# Patient Record
Sex: MALE | Race: WHITE | HISPANIC OR LATINO | Employment: FULL TIME | URBAN - METROPOLITAN AREA
[De-identification: names, ages, dates, MRNs, and addresses within clinical notes are randomized per-mention and may not be internally consistent; named-entity substitution may affect disease eponyms.]

---

## 2017-12-15 ENCOUNTER — HOSPITAL ENCOUNTER (EMERGENCY)
Facility: MEDICAL CENTER | Age: 47
End: 2017-12-16
Attending: EMERGENCY MEDICINE

## 2017-12-15 DIAGNOSIS — S01.81XA FACIAL LACERATION, INITIAL ENCOUNTER: ICD-10-CM

## 2017-12-15 PROCEDURE — 303747 HCHG EXTRA SUTURE

## 2017-12-15 PROCEDURE — 304992 HCHG REPAIR-COMPLEX-LVL 1,1.1-7.5CM

## 2017-12-15 PROCEDURE — 99284 EMERGENCY DEPT VISIT MOD MDM: CPT

## 2017-12-15 PROCEDURE — 304217 HCHG IRRIGATION SYSTEM

## 2017-12-15 PROCEDURE — 13132 CMPLX RPR F/C/C/M/N/AX/G/H/F: CPT

## 2017-12-15 PROCEDURE — 700101 HCHG RX REV CODE 250

## 2017-12-15 RX ORDER — LIDOCAINE HYDROCHLORIDE AND EPINEPHRINE 10; 10 MG/ML; UG/ML
INJECTION, SOLUTION INFILTRATION; PERINEURAL ONCE
Status: CANCELLED | OUTPATIENT
Start: 2017-12-15 | End: 2017-12-15

## 2017-12-15 RX ORDER — LIDOCAINE HYDROCHLORIDE AND EPINEPHRINE BITARTRATE 20; .01 MG/ML; MG/ML
INJECTION, SOLUTION SUBCUTANEOUS
Status: COMPLETED
Start: 2017-12-15 | End: 2017-12-15

## 2017-12-15 RX ORDER — LIDOCAINE HYDROCHLORIDE AND EPINEPHRINE BITARTRATE 20; .01 MG/ML; MG/ML
10 INJECTION, SOLUTION SUBCUTANEOUS ONCE
Status: COMPLETED | OUTPATIENT
Start: 2017-12-15 | End: 2017-12-15

## 2017-12-15 RX ORDER — LIDOCAINE HCL/EPINEPHRINE/PF 2%-1:200K
VIAL (ML) INJECTION
Status: DISCONTINUED
Start: 2017-12-15 | End: 2017-12-16 | Stop reason: HOSPADM

## 2017-12-15 RX ADMIN — LIDOCAINE HYDROCHLORIDE AND EPINEPHRINE BITARTRATE 10 ML: 20; .01 INJECTION, SOLUTION SUBCUTANEOUS at 23:17

## 2017-12-15 RX ADMIN — LIDOCAINE HYDROCHLORIDE AND EPINEPHRINE 10 ML: 20; 10 INJECTION, SOLUTION INFILTRATION; PERINEURAL at 23:17

## 2017-12-15 ASSESSMENT — PAIN SCALES - GENERAL: PAINLEVEL_OUTOF10: 8

## 2017-12-16 VITALS
BODY MASS INDEX: 28.58 KG/M2 | TEMPERATURE: 98.2 F | RESPIRATION RATE: 18 BRPM | HEIGHT: 69 IN | WEIGHT: 193 LBS | HEART RATE: 102 BPM | OXYGEN SATURATION: 94 %

## 2017-12-16 PROCEDURE — 13132 CMPLX RPR F/C/C/M/N/AX/G/H/F: CPT

## 2017-12-16 PROCEDURE — 700111 HCHG RX REV CODE 636 W/ 250 OVERRIDE (IP): Performed by: EMERGENCY MEDICINE

## 2017-12-16 PROCEDURE — 90715 TDAP VACCINE 7 YRS/> IM: CPT | Performed by: EMERGENCY MEDICINE

## 2017-12-16 PROCEDURE — 90471 IMMUNIZATION ADMIN: CPT

## 2017-12-16 PROCEDURE — 304992 HCHG REPAIR-COMPLEX-LVL 1,1.1-7.5CM

## 2017-12-16 RX ADMIN — CLOSTRIDIUM TETANI TOXOID ANTIGEN (FORMALDEHYDE INACTIVATED), CORYNEBACTERIUM DIPHTHERIAE TOXOID ANTIGEN (FORMALDEHYDE INACTIVATED), BORDETELLA PERTUSSIS TOXOID ANTIGEN (GLUTARALDEHYDE INACTIVATED), BORDETELLA PERTUSSIS FILAMENTOUS HEMAGGLUTININ ANTIGEN (FORMALDEHYDE INACTIVATED), BORDETELLA PERTUSSIS PERTACTIN ANTIGEN, AND BORDETELLA PERTUSSIS FIMBRIAE 2/3 ANTIGEN 0.5 ML: 5; 2; 2.5; 5; 3; 5 INJECTION, SUSPENSION INTRAMUSCULAR at 00:04
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
## 2017-12-16 NOTE — DISCHARGE INSTRUCTIONS
Facial Laceration   A facial laceration is a cut on the face. These injuries can be painful and cause bleeding. Lacerations usually heal quickly, but they need special care to reduce scarring.  DIAGNOSIS   Your health care provider will take a medical history, ask for details about how the injury occurred, and examine the wound to determine how deep the cut is.  TREATMENT   Some facial lacerations may not require closure. Others may not be able to be closed because of an increased risk of infection. The risk of infection and the chance for successful closure will depend on various factors, including the amount of time since the injury occurred.  The wound may be cleaned to help prevent infection. If closure is appropriate, pain medicines may be given if needed. Your health care provider will use stitches (sutures), wound glue (adhesive), or skin adhesive strips to repair the laceration. These tools bring the skin edges together to allow for faster healing and a better cosmetic outcome. If needed, you may also be given a tetanus shot.  HOME CARE INSTRUCTIONS  · Only take over-the-counter or prescription medicines as directed by your health care provider.  · Follow your health care provider's instructions for wound care. These instructions will vary depending on the technique used for closing the wound.  For Sutures:  · Keep the wound clean and dry.    · If you were given a bandage (dressing), you should change it at least once a day. Also change the dressing if it becomes wet or dirty, or as directed by your health care provider.    · Wash the wound with soap and water 2 times a day. Rinse the wound off with water to remove all soap. Pat the wound dry with a clean towel.    · After cleaning, apply a thin layer of the antibiotic ointment recommended by your health care provider. This will help prevent infection and keep the dressing from sticking.    · You may shower as usual after the first 24 hours. Do not soak the  wound in water until the sutures are removed.    · Get your sutures removed as directed by your health care provider. With facial lacerations, sutures should usually be taken out after 4-5 days to avoid stitch marks.    · Wait a few days after your sutures are removed before applying any makeup.  For Skin Adhesive Strips:  · Keep the wound clean and dry.    · Do not get the skin adhesive strips wet. You may bathe carefully, using caution to keep the wound dry.    · If the wound gets wet, pat it dry with a clean towel.    · Skin adhesive strips will fall off on their own. You may trim the strips as the wound heals. Do not remove skin adhesive strips that are still stuck to the wound. They will fall off in time.    For Wound Adhesive:  · You may briefly wet your wound in the shower or bath. Do not soak or scrub the wound. Do not swim. Avoid periods of heavy sweating until the skin adhesive has fallen off on its own. After showering or bathing, gently pat the wound dry with a clean towel.    · Do not apply liquid medicine, cream medicine, ointment medicine, or makeup to your wound while the skin adhesive is in place. This may loosen the film before your wound is healed.    · If a dressing is placed over the wound, be careful not to apply tape directly over the skin adhesive. This may cause the adhesive to be pulled off before the wound is healed.    · Avoid prolonged exposure to sunlight or tanning lamps while the skin adhesive is in place.  · The skin adhesive will usually remain in place for 5-10 days, then naturally fall off the skin. Do not pick at the adhesive film.    After Healing:  Once the wound has healed, cover the wound with sunscreen during the day for 1 full year. This can help minimize scarring. Exposure to ultraviolet light in the first year will darken the scar. It can take 1-2 years for the scar to lose its redness and to heal completely.   SEEK MEDICAL CARE IF:  · You have a fever.  SEEK IMMEDIATE  MEDICAL CARE IF:  · You have redness, pain, or swelling around the wound.    · You see a yellowish-white fluid (pus) coming from the wound.       This information is not intended to replace advice given to you by your health care provider. Make sure you discuss any questions you have with your health care provider.     Document Released: 01/25/2006 Document Revised: 01/08/2016 Document Reviewed: 07/31/2014  [employer] Interactive Patient Education ©2016 [employer]

## 2017-12-16 NOTE — ED NOTES
Dressing applied to Pt's chin wound with Abx ointment, gauze dsg, and tape. Pt becoming very agitated, states he wants to leave. Informed Pt that he still needs CT scans.
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
## 2017-12-16 NOTE — ED NOTES
"Pt very agitated, pacing around room/hallway. Stated, \"I don't want to wait anymore,\" and walked out of the ED.   "

## 2020-01-18 ENCOUNTER — NON-PROVIDER VISIT (OUTPATIENT)
Dept: URGENT CARE | Facility: CLINIC | Age: 50
End: 2020-01-18

## 2020-01-18 DIAGNOSIS — Z02.1 PRE-EMPLOYMENT DRUG SCREENING: ICD-10-CM

## 2020-01-18 LAB
AMP AMPHETAMINE: NORMAL
COC COCAINE: NORMAL
INT CON NEG: NORMAL
INT CON POS: NORMAL
MET METHAMPHETAMINES: NORMAL
OPI OPIATES: NORMAL
PCP PHENCYCLIDINE: NORMAL
POC DRUG COMMENT 753798-OCCUPATIONAL HEALTH: NEGATIVE
THC: NORMAL

## 2020-01-18 PROCEDURE — 80305 DRUG TEST PRSMV DIR OPT OBS: CPT | Performed by: NURSE PRACTITIONER
